# Patient Record
Sex: MALE | URBAN - METROPOLITAN AREA
[De-identification: names, ages, dates, MRNs, and addresses within clinical notes are randomized per-mention and may not be internally consistent; named-entity substitution may affect disease eponyms.]

---

## 2022-12-07 ENCOUNTER — APPOINTMENT (RX ONLY)
Dept: URBAN - METROPOLITAN AREA CLINIC 121 | Facility: CLINIC | Age: 32
Setting detail: DERMATOLOGY
End: 2022-12-07

## 2022-12-07 DIAGNOSIS — L30.4 ERYTHEMA INTERTRIGO: ICD-10-CM

## 2022-12-07 DIAGNOSIS — L30.9 DERMATITIS, UNSPECIFIED: ICD-10-CM

## 2022-12-07 DIAGNOSIS — D22 MELANOCYTIC NEVI: ICD-10-CM

## 2022-12-07 DIAGNOSIS — L91.8 OTHER HYPERTROPHIC DISORDERS OF THE SKIN: ICD-10-CM

## 2022-12-07 DIAGNOSIS — L81.4 OTHER MELANIN HYPERPIGMENTATION: ICD-10-CM

## 2022-12-07 PROBLEM — D22.5 MELANOCYTIC NEVI OF TRUNK: Status: ACTIVE | Noted: 2022-12-07

## 2022-12-07 PROCEDURE — ? PRESCRIPTION MEDICATION MANAGEMENT

## 2022-12-07 PROCEDURE — 99203 OFFICE O/P NEW LOW 30 MIN: CPT

## 2022-12-07 PROCEDURE — ? SUNSCREEN RECOMMENDATIONS

## 2022-12-07 PROCEDURE — ? COUNSELING

## 2022-12-07 PROCEDURE — ? PRESCRIPTION

## 2022-12-07 RX ORDER — CLOTRIMAZOLE AND BETAMETHASONE DIPROPIONATE 10; .5 MG/G; MG/G
CREAM TOPICAL BID
Qty: 45 | Refills: 1 | Status: ERX | COMMUNITY
Start: 2022-12-07

## 2022-12-07 RX ADMIN — CLOTRIMAZOLE AND BETAMETHASONE DIPROPIONATE: 10; .5 CREAM TOPICAL at 00:00

## 2022-12-07 ASSESSMENT — LOCATION SIMPLE DESCRIPTION DERM
LOCATION SIMPLE: SCROTUM
LOCATION SIMPLE: ABDOMEN
LOCATION SIMPLE: RIGHT FOREARM
LOCATION SIMPLE: LOWER BACK
LOCATION SIMPLE: CHEST

## 2022-12-07 ASSESSMENT — LOCATION ZONE DERM
LOCATION ZONE: TRUNK
LOCATION ZONE: GENITALIA
LOCATION ZONE: ARM

## 2022-12-07 ASSESSMENT — LOCATION DETAILED DESCRIPTION DERM
LOCATION DETAILED: RIGHT PROXIMAL DORSAL FOREARM
LOCATION DETAILED: INFERIOR LUMBAR SPINE
LOCATION DETAILED: RIGHT ANTERIOR SCROTUM
LOCATION DETAILED: STERNUM
LOCATION DETAILED: PERIUMBILICAL SKIN

## 2022-12-07 NOTE — PROCEDURE: PRESCRIPTION MEDICATION MANAGEMENT
No
Render In Strict Bullet Format?: No
Detail Level: Zone
Initiate Treatment: Apply Clotrimazole and Betamethasone cream bid x top of buttock area x 2 weeks
Initiate Treatment: Apply Clotrimazole and Betamethasone cream bid x 2 weeks to right elbow